# Patient Record
Sex: MALE | Race: WHITE | NOT HISPANIC OR LATINO | URBAN - METROPOLITAN AREA
[De-identification: names, ages, dates, MRNs, and addresses within clinical notes are randomized per-mention and may not be internally consistent; named-entity substitution may affect disease eponyms.]

---

## 2024-05-30 ENCOUNTER — TELEPHONE (OUTPATIENT)
Dept: PSYCHIATRY | Facility: CLINIC | Age: 19
End: 2024-05-30

## 2024-11-04 ENCOUNTER — TELEPHONE (OUTPATIENT)
Age: 19
End: 2024-11-04

## 2025-01-27 ENCOUNTER — TELEPHONE (OUTPATIENT)
Age: 20
End: 2025-01-27

## 2025-01-27 NOTE — TELEPHONE ENCOUNTER
Outreach call placed in an attempt to schedule pt from [facility] wait list. Pt explains that he now has a job that is quite demanding. Pt would like to discuss this matter further with parents. Will call back, [phone] - opt #3 for intake.       Insurance verified:  [employer] (primary)  ID#: [id number]    via [facility]:  [employer] (Secondary)  ID: [id number]        1st attempt